# Patient Record
Sex: FEMALE | Race: WHITE | Employment: PART TIME | URBAN - METROPOLITAN AREA
[De-identification: names, ages, dates, MRNs, and addresses within clinical notes are randomized per-mention and may not be internally consistent; named-entity substitution may affect disease eponyms.]

---

## 2018-04-25 ENCOUNTER — HOSPITAL ENCOUNTER (EMERGENCY)
Age: 38
Discharge: HOME | End: 2018-04-25
Payer: COMMERCIAL

## 2018-04-25 VITALS
SYSTOLIC BLOOD PRESSURE: 131 MMHG | HEART RATE: 52 BPM | DIASTOLIC BLOOD PRESSURE: 78 MMHG | OXYGEN SATURATION: 100 % | RESPIRATION RATE: 16 BRPM | TEMPERATURE: 97.8 F | WEIGHT: 180 LBS

## 2018-04-25 DIAGNOSIS — H10.9 CONJUNCTIVITIS OF LEFT EYE, UNSPECIFIED CONJUNCTIVITIS TYPE: Primary | ICD-10-CM

## 2018-04-25 PROCEDURE — 99212 OFFICE O/P EST SF 10 MIN: CPT

## 2018-04-25 RX ORDER — OMEPRAZOLE 20 MG/1
20 CAPSULE, DELAYED RELEASE ORAL DAILY
COMMUNITY

## 2018-04-25 RX ORDER — ESTRADIOL 1 MG/1
1 TABLET ORAL DAILY
COMMUNITY

## 2018-04-25 RX ORDER — SULFACETAMIDE SODIUM 100 MG/ML
2 SOLUTION/ DROPS OPHTHALMIC 4 TIMES DAILY
Qty: 1 BOTTLE | Refills: 0 | Status: SHIPPED | OUTPATIENT
Start: 2018-04-25 | End: 2018-04-30

## 2018-04-25 ASSESSMENT — PAIN DESCRIPTION - LOCATION: LOCATION: EYE

## 2018-04-25 ASSESSMENT — PAIN DESCRIPTION - FREQUENCY: FREQUENCY: CONTINUOUS

## 2018-04-25 ASSESSMENT — PAIN DESCRIPTION - ORIENTATION: ORIENTATION: LEFT

## 2018-04-25 ASSESSMENT — PAIN DESCRIPTION - DESCRIPTORS: DESCRIPTORS: ITCHING

## 2018-04-25 ASSESSMENT — PAIN DESCRIPTION - PAIN TYPE: TYPE: ACUTE PAIN

## 2018-07-16 ENCOUNTER — HOSPITAL ENCOUNTER (OUTPATIENT)
Dept: MAMMOGRAPHY | Age: 38
Discharge: HOME | End: 2018-07-18
Payer: COMMERCIAL

## 2018-07-16 ENCOUNTER — HOSPITAL ENCOUNTER (OUTPATIENT)
Dept: ULTRASOUND IMAGING | Age: 38
Discharge: HOME | End: 2018-07-16
Payer: COMMERCIAL

## 2018-07-16 DIAGNOSIS — N64.52 NIPPLE DISCHARGE: ICD-10-CM

## 2018-07-16 DIAGNOSIS — N64.52 BILATERAL NIPPLE DISCHARGE: ICD-10-CM

## 2018-07-16 PROCEDURE — 77065 DX MAMMO INCL CAD UNI: CPT

## 2018-07-16 PROCEDURE — 76642 ULTRASOUND BREAST LIMITED: CPT

## 2018-07-31 RX ORDER — METHYLPREDNISOLONE 4 MG/1
4 TABLET ORAL SEE ADMIN INSTRUCTIONS
COMMUNITY
End: 2018-09-13 | Stop reason: ALTCHOICE

## 2018-07-31 RX ORDER — ALBUTEROL SULFATE 90 UG/1
2 AEROSOL, METERED RESPIRATORY (INHALATION) PRN
COMMUNITY
End: 2018-09-13 | Stop reason: ALTCHOICE

## 2018-09-13 ENCOUNTER — HOSPITAL ENCOUNTER (EMERGENCY)
Age: 38
Discharge: HOME | End: 2018-09-13
Payer: COMMERCIAL

## 2018-09-13 ENCOUNTER — APPOINTMENT (OUTPATIENT)
Dept: GENERAL RADIOLOGY | Age: 38
End: 2018-09-13
Payer: COMMERCIAL

## 2018-09-13 VITALS
SYSTOLIC BLOOD PRESSURE: 105 MMHG | WEIGHT: 180 LBS | DIASTOLIC BLOOD PRESSURE: 61 MMHG | HEART RATE: 60 BPM | OXYGEN SATURATION: 100 % | TEMPERATURE: 97.6 F | RESPIRATION RATE: 16 BRPM

## 2018-09-13 DIAGNOSIS — S93.401A SPRAIN OF RIGHT ANKLE, UNSPECIFIED LIGAMENT, INITIAL ENCOUNTER: Primary | ICD-10-CM

## 2018-09-13 PROCEDURE — 73610 X-RAY EXAM OF ANKLE: CPT

## 2018-09-13 PROCEDURE — 99212 OFFICE O/P EST SF 10 MIN: CPT

## 2018-09-13 PROCEDURE — L4350 ANKLE CONTROL ORTHO PRE OTS: HCPCS

## 2018-09-13 ASSESSMENT — PAIN DESCRIPTION - ORIENTATION: ORIENTATION: RIGHT

## 2018-09-13 ASSESSMENT — PAIN SCALES - GENERAL: PAINLEVEL_OUTOF10: 9

## 2018-09-13 ASSESSMENT — PAIN DESCRIPTION - FREQUENCY: FREQUENCY: CONTINUOUS

## 2018-09-13 ASSESSMENT — PAIN DESCRIPTION - DESCRIPTORS: DESCRIPTORS: ACHING;DISCOMFORT;SORE;CONSTANT

## 2018-09-13 ASSESSMENT — PAIN DESCRIPTION - LOCATION: LOCATION: ANKLE

## 2018-09-13 ASSESSMENT — PAIN DESCRIPTION - PROGRESSION: CLINICAL_PROGRESSION: GRADUALLY WORSENING

## 2018-09-13 ASSESSMENT — PAIN DESCRIPTION - PAIN TYPE: TYPE: ACUTE PAIN

## 2018-09-13 NOTE — ED PROVIDER NOTES
ROM: diminished range of motion. Skin:  Ecchymosis to lat mal.        Neurovascular: Motor deficit: none. Sensory deficit: none. Pulse deficit: none. Capillary refill: normal.  · Gait:  limp. · Lymphatics: No lymphangitis or adenopathy noted. · Neurological:  Oriented x3. Motor functions intact. Lab / Imaging Results   (All laboratory and radiology results have been personally reviewed by myself)  Labs:  No results found for this visit on 09/13/18. Imaging: All Radiology results interpreted by Radiologist unless otherwise noted. XR ANKLE RIGHT (MIN 3 VIEWS)   Final Result   Soft tissue swelling over the lateral malleolus, without   acute fracture or dislocation. If clinical suspicion of an acute   fracture persists, 7 to 10 day followup films may be obtained               ED Course / Medical Decision Making   Medications - No data to display     Consults:   None    Procedure(s):   none    MDM:       Based on moderate suspicion for bony injury as per history/physical findings imaging was done and confirms the above findings. . Plan is subsequently for symptom control, limited use and  immobilization with appropriate outpatient follow-up. Counseling:   I have  spoken with the patient and spouse/SO and discussed todays results, in addition to providing specific details for the plan of care and counseling regarding the diagnosis and prognosis. Questions are answered at this time and they are agreeable with the plan. Assessment      1.  Sprain of right ankle, unspecified ligament, initial encounter      Plan   Discharge to home and advised to contact True Morales [phone]      As needed   Patient condition is good    New Medications     New Prescriptions    No medications on file     Electronically signed by REVA Fernando   DD: 9/13/18  **This report was transcribed using voice recognition software. Every effort was made to ensure accuracy; however, inadvertent computerized transcription errors may be present.   END OF ED PROVIDER NOTE       Rhina Millerma  09/13/18 1265

## 2020-02-18 ENCOUNTER — HOSPITAL ENCOUNTER (EMERGENCY)
Age: 40
Discharge: HOME | End: 2020-02-18
Payer: OTHER GOVERNMENT

## 2020-02-18 VITALS
RESPIRATION RATE: 18 BRPM | DIASTOLIC BLOOD PRESSURE: 83 MMHG | SYSTOLIC BLOOD PRESSURE: 120 MMHG | TEMPERATURE: 98.6 F | HEART RATE: 62 BPM | WEIGHT: 200 LBS | OXYGEN SATURATION: 99 %

## 2020-02-18 LAB
INFLUENZA A BY PCR: NOT DETECTED
INFLUENZA B BY PCR: NOT DETECTED
STREP GRP A PCR: NEGATIVE

## 2020-02-18 PROCEDURE — 87804 INFLUENZA ASSAY W/OPTIC: CPT

## 2020-02-18 PROCEDURE — 87880 STREP A ASSAY W/OPTIC: CPT

## 2020-02-18 PROCEDURE — 99212 OFFICE O/P EST SF 10 MIN: CPT

## 2020-02-18 PROCEDURE — 87502 INFLUENZA DNA AMP PROBE: CPT

## 2020-02-18 RX ORDER — BROMPHENIRAMINE MALEATE, PSEUDOEPHEDRINE HYDROCHLORIDE, AND DEXTROMETHORPHAN HYDROBROMIDE 2; 30; 10 MG/5ML; MG/5ML; MG/5ML
10 SYRUP ORAL 4 TIMES DAILY PRN
Qty: 140 ML | Refills: 0 | Status: SHIPPED | OUTPATIENT
Start: 2020-02-18 | End: 2020-02-25

## 2020-02-18 RX ORDER — CETIRIZINE HYDROCHLORIDE 10 MG/1
10 TABLET ORAL DAILY
COMMUNITY

## 2020-02-18 RX ORDER — AMOXICILLIN AND CLAVULANATE POTASSIUM 875; 125 MG/1; MG/1
1 TABLET, FILM COATED ORAL 2 TIMES DAILY
Qty: 20 TABLET | Refills: 0 | Status: SHIPPED | OUTPATIENT
Start: 2020-02-18 | End: 2020-02-28

## 2020-02-18 NOTE — ED PROVIDER NOTES
Department of Emergency Medicine  [address] [place] [address]  Provider Note  Admit Date/Time: 2/18/2020 12:35 PM  Room: 07/07  MRN: [id number]  Chief Complaint: Sinusitis (congestion, drainage, fever, pressure, chills, sweats, cough, sore throat, ears ache, started Saturday)       History of Present Illness   Source of history provided by:  patient. History/Exam Limitations: none. Claire Diaz is a 46 y.o. female who has a past medical history of:   Past Medical History:   Diagnosis Date    Hearing loss of both ears     wears bilateral hearing aids - fluent speech     presents to the urgent care center by private car for evaluation. .  She reports that she feels like \"I got hit by a truck \"she is achy all over she is fatigued. She has sinus congestion and drainage she has ear pressure, she has a sore throat, she has a cough. She is achy all over. This started 2 days ago. ROS    Pertinent positives and negatives are stated within HPI, all other systems reviewed and are negative. History reviewed. No pertinent surgical history. Social History:  reports that she has been smoking cigarettes. She has smoked for the past 2.00 years. She has never used smokeless tobacco. She reports that she does not drink alcohol or use drugs. Family History: family history is not on file. Allergies: Codeine    Physical Exam   Oxygen Saturation Interpretation: Normal.   ED Triage Vitals [02/18/20 1237]   BP Temp Temp Source Pulse Resp SpO2 Height Weight   120/83 98.6 °F (37 °C) Oral 62 18 99 % -- 200 lb (90.7 kg)       Physical Exam  · Constitutional/General: Alert and oriented x3, well appearing, non toxic in NAD  · HEENT:  NC/NT. Conjunctiva clear, bilateral TMs normal, pharynx has mild erythema, no tonsillar neoadjuvant no exudates. Airway patent. Tender over the maxillary  Sinuses.   · Neck: Supple, full ROM, non tender to palpation in the midline, no stridor, no crepitus, no meningeal unspecified location      Plan   Discharge to home and advised to contact True Mcdowell [phone]      As needed   Patient condition is good    New Medications     New Prescriptions    AMOXICILLIN-CLAVULANATE (AUGMENTIN) 875-125 MG PER TABLET    Take 1 tablet by mouth 2 times daily for 10 days    BROMPHENIRAMINE-PSEUDOEPHEDRINE-DM 2-30-10 MG/5ML SYRUP    Take 10 mLs by mouth 4 times daily as needed for Congestion or Cough     Electronically signed by ALISON Richardson CNP   DD: 2/18/20  **This report was transcribed using voice recognition software. Every effort was made to ensure accuracy; however, inadvertent computerized transcription errors may be present.   END OF ED PROVIDER NOTE     ALISON Richardson CNP  02/18/20 2970

## 2021-05-26 ENCOUNTER — HOSPITAL ENCOUNTER (EMERGENCY)
Age: 41
Discharge: HOME | End: 2021-05-26
Payer: COMMERCIAL

## 2021-05-26 ENCOUNTER — APPOINTMENT (OUTPATIENT)
Dept: GENERAL RADIOLOGY | Age: 41
End: 2021-05-26
Payer: COMMERCIAL

## 2021-05-26 VITALS
HEART RATE: 77 BPM | TEMPERATURE: 98.2 F | HEIGHT: 65 IN | OXYGEN SATURATION: 98 % | SYSTOLIC BLOOD PRESSURE: 117 MMHG | WEIGHT: 200 LBS | DIASTOLIC BLOOD PRESSURE: 80 MMHG | RESPIRATION RATE: 18 BRPM | BODY MASS INDEX: 33.32 KG/M2

## 2021-05-26 DIAGNOSIS — S39.012A STRAIN OF LUMBAR REGION, INITIAL ENCOUNTER: Primary | ICD-10-CM

## 2021-05-26 PROCEDURE — 72110 X-RAY EXAM L-2 SPINE 4/>VWS: CPT

## 2021-05-26 PROCEDURE — 6360000002 HC RX W HCPCS: Performed by: NURSE PRACTITIONER

## 2021-05-26 PROCEDURE — 99211 OFF/OP EST MAY X REQ PHY/QHP: CPT

## 2021-05-26 RX ORDER — DEXAMETHASONE SODIUM PHOSPHATE 10 MG/ML
10 INJECTION, SOLUTION INTRAMUSCULAR; INTRAVENOUS ONCE
Status: DISCONTINUED | OUTPATIENT
Start: 2021-05-26 | End: 2021-05-26

## 2021-05-26 RX ORDER — MENTHOL 40 MG/ML
GEL TOPICAL
Qty: 1 TUBE | Refills: 0 | Status: SHIPPED | OUTPATIENT
Start: 2021-05-26

## 2021-05-26 RX ORDER — KETOROLAC TROMETHAMINE 30 MG/ML
30 INJECTION, SOLUTION INTRAMUSCULAR; INTRAVENOUS ONCE
Status: COMPLETED | OUTPATIENT
Start: 2021-05-26 | End: 2021-05-26

## 2021-05-26 RX ORDER — NAPROXEN 500 MG/1
500 TABLET ORAL 2 TIMES DAILY
Qty: 14 TABLET | Refills: 0 | Status: SHIPPED | OUTPATIENT
Start: 2021-05-26 | End: 2021-06-02

## 2021-05-26 RX ORDER — METHYLPREDNISOLONE 4 MG/1
4 TABLET ORAL SEE ADMIN INSTRUCTIONS
COMMUNITY
Start: 2021-05-25 | End: 2021-05-31

## 2021-05-26 RX ORDER — CYCLOBENZAPRINE HCL 10 MG
10 TABLET ORAL 2 TIMES DAILY PRN
Qty: 14 TABLET | Refills: 0 | Status: SHIPPED | OUTPATIENT
Start: 2021-05-26 | End: 2021-06-02

## 2021-05-26 RX ADMIN — KETOROLAC TROMETHAMINE 30 MG: 30 INJECTION, SOLUTION INTRAMUSCULAR; INTRAVENOUS at 08:59

## 2021-05-26 ASSESSMENT — PAIN DESCRIPTION - ORIENTATION: ORIENTATION: LOWER

## 2021-05-26 ASSESSMENT — PAIN DESCRIPTION - PAIN TYPE: TYPE: ACUTE PAIN

## 2021-05-26 ASSESSMENT — PAIN DESCRIPTION - ONSET: ONSET: ON-GOING

## 2021-05-26 ASSESSMENT — PAIN SCALES - GENERAL
PAINLEVEL_OUTOF10: 8
PAINLEVEL_OUTOF10: 6

## 2021-05-26 ASSESSMENT — PAIN DESCRIPTION - LOCATION: LOCATION: BACK

## 2021-05-26 ASSESSMENT — PAIN - FUNCTIONAL ASSESSMENT: PAIN_FUNCTIONAL_ASSESSMENT: 0-10

## 2021-05-26 ASSESSMENT — PAIN DESCRIPTION - PROGRESSION: CLINICAL_PROGRESSION: GRADUALLY WORSENING

## 2021-05-26 NOTE — ED PROVIDER NOTES
Department of Emergency [facility]  Provider Note  Admit Date/RoomTime: 2021  8:28 AM  Room:   NAME: Shawn Muniz  : 1980  MRN: [id number]     Chief Complaint:  Back Pain (Pt states \"I was bending down to  a bag of Mulch\" )    History of Present Illness       Janell Min is a 45 y.o. old female who has a past medical history of:   Past Medical History:   Diagnosis Date    Hearing loss of both ears     wears bilateral hearing aids - fluent speech     presents to the urgent care center by private vehicle, for evaluation of low back pain. She said it started 2 days ago she bent down to  a bag of mulch and had instant pain. She said any type of movement causes the pain to increase. She said walking is not painful but sitting lying or twisting or bending is very painful. She denies any saddle anesthesia denies any numbness tingling or weakness in her legs denies any fever denies any urinary symptoms. ROS   Pertinent positives and negatives are stated within HPI, all other systems reviewed and are negative. History reviewed. No pertinent surgical history. Social History:  reports that she has been smoking cigarettes. She has smoked for the past 2.00 years. She has never used smokeless tobacco. She reports that she does not drink alcohol and does not use drugs. Family History: family history is not on file. Allergies: Codeine    Physical Exam           ED Triage Vitals [21 0829]   BP Temp Temp Source Pulse Resp SpO2 Height Weight   117/80 98.2 °F (36.8 °C) Temporal 77 18 98 % 5' 5\" (1.651 m) 200 lb (90.7 kg)      Oxygen Saturation Interpretation: Normal.    Constitutional:  Alert, development consistent with age. HEENT:  NC/NT. Airway patent. Neck:  Normal ROM. Supple.   Respiratory:  Clear to auscultation and breath sounds equal.  CV:  Regular rate and rhythm, normal heart sounds, without pathological murmurs, ectopy, gallops, or rubs. GI:  Abdomen Soft, nontender, good bowel sounds. No firm or pulsatile mass. Back: lower lumbar spine central.             Tenderness: None. Swelling: no.              Range of Motion: diminished range with pain. CVA Tenderness: No.            Straight leg raising:  Bilateral negative. Skin:  no erythema, rash or swelling noted. Distal Function:            Normal gait, normal strength in the bilateral lower extremities. Integument:  Normal turgor. Warm, dry, without visible rash. Lymphatics: No lymphangitis or adenopathy noted. Neurological:  Oriented. Motor functions intact. Lab / Imaging Results   (All laboratory and radiology results have been personally reviewed by myself)  Labs:  No results found for this visit on 05/26/21. Imaging: All Radiology results interpreted by Radiologist unless otherwise noted. XR LUMBAR SPINE (MIN 4 VIEWS)   Final Result   1. There is no acute fracture or subluxation of the lumbar spine   2. Mild degenerative disc disease at the L3-4 level. ED Course / Medical Decision Making     Medications   ketorolac (TORADOL) injection 30 mg (30 mg Intramuscular Given 5/26/21 0859)        RMedical Decision Making: At this time the patient is without objective evidence of an acute process requiring inpatient management. X-ray was done and it did show some degenerative changes but no other acute findings. She was given Toradol IM here with slight relief. I did send her home with a prescription for Naprosyn, Flexeril, and Biofreeze advised to take those as needed. Advised her to follow-up with her doctor if she develops fever numbness tingling or weakness in her legs or groin area urinary incontinence or symptoms or any other problems she should go to the ED for evaluation. Assessment      1.  Strain of lumbar region, initial encounter      Plan   Discharge to home and advised to contact Jayjay York

## 2022-03-16 ENCOUNTER — HOSPITAL ENCOUNTER (OUTPATIENT)
Dept: MAMMOGRAPHY | Age: 42
Discharge: HOME | End: 2022-03-18
Payer: OTHER GOVERNMENT

## 2022-03-16 VITALS — BODY MASS INDEX: 35.85 KG/M2 | WEIGHT: 210 LBS | HEIGHT: 64 IN

## 2022-03-16 DIAGNOSIS — Z12.31 ENCOUNTER FOR SCREENING MAMMOGRAM FOR MALIGNANT NEOPLASM OF BREAST: ICD-10-CM

## 2022-03-16 PROCEDURE — 77067 SCR MAMMO BI INCL CAD: CPT

## 2022-03-31 ENCOUNTER — HOSPITAL ENCOUNTER (EMERGENCY)
Age: 42
Discharge: HOME | End: 2022-03-31
Payer: OTHER GOVERNMENT

## 2022-03-31 VITALS
DIASTOLIC BLOOD PRESSURE: 80 MMHG | WEIGHT: 200 LBS | BODY MASS INDEX: 34.15 KG/M2 | RESPIRATION RATE: 18 BRPM | SYSTOLIC BLOOD PRESSURE: 107 MMHG | OXYGEN SATURATION: 98 % | HEART RATE: 108 BPM | TEMPERATURE: 100.9 F | HEIGHT: 64 IN

## 2022-03-31 DIAGNOSIS — J06.9 URI WITH COUGH AND CONGESTION: Primary | ICD-10-CM

## 2022-03-31 LAB — STREP GRP A PCR: NEGATIVE

## 2022-03-31 PROCEDURE — 87880 STREP A ASSAY W/OPTIC: CPT

## 2022-03-31 PROCEDURE — 99211 OFF/OP EST MAY X REQ PHY/QHP: CPT

## 2022-03-31 RX ORDER — AZITHROMYCIN 250 MG/1
TABLET, FILM COATED ORAL
Qty: 1 PACKET | Refills: 0 | Status: SHIPPED | OUTPATIENT
Start: 2022-03-31 | End: 2022-04-10

## 2022-03-31 RX ORDER — BROMPHENIRAMINE MALEATE, PSEUDOEPHEDRINE HYDROCHLORIDE, AND DEXTROMETHORPHAN HYDROBROMIDE 2; 30; 10 MG/5ML; MG/5ML; MG/5ML
10 SYRUP ORAL 3 TIMES DAILY PRN
Qty: 120 ML | Refills: 0 | Status: SHIPPED | OUTPATIENT
Start: 2022-03-31

## 2022-03-31 ASSESSMENT — PAIN DESCRIPTION - LOCATION: LOCATION: GENERALIZED

## 2022-03-31 NOTE — ED PROVIDER NOTES
[facility]  Department of Emergency Medicine  UC Encounter Note  3/31/22   8:40 AM EDT      NAME: Yossi Mack  :  1980  MRN:  [id number]    Chief Complaint: Fever (ears sore throat cough headache congestion started tues)      This is a 60-year-old female the presents to urgent care complaining of cough and URI symptoms sinus symptoms for the past couple days. Other people in the family have similar problems. She denies abdominal pain nausea vomiting diarrhea or urinary symptoms. On first contact patient she appears to be in no acute distress          Review of Systems  Pertinent positives and negatives are stated within HPI, all other systems reviewed and are negative. Physical Exam  Vitals and nursing note reviewed. Constitutional:       Appearance: She is well-developed. HENT:      Head: Normocephalic and atraumatic. Jaw: There is normal jaw occlusion. Right Ear: Hearing, ear canal and external ear normal. Tympanic membrane is bulging. Left Ear: Hearing, ear canal and external ear normal. Tympanic membrane is bulging. Nose: Congestion and rhinorrhea present. Right Sinus: Frontal sinus tenderness present. Left Sinus: Frontal sinus tenderness present. Mouth/Throat:      Mouth: Mucous membranes are moist. No angioedema. Pharynx: Oropharynx is clear. Uvula midline. Eyes:      General: Lids are normal.      Conjunctiva/sclera: Conjunctivae normal.      Pupils: Pupils are equal, round, and reactive to light. Cardiovascular:      Rate and Rhythm: Normal rate and regular rhythm. Heart sounds: Normal heart sounds. No murmur heard. Pulmonary:      Effort: Pulmonary effort is normal.      Breath sounds: Normal breath sounds. Abdominal:      General: Bowel sounds are normal.      Palpations: Abdomen is soft. Abdomen is not rigid. Tenderness: There is no abdominal tenderness. There is no guarding or rebound. Musculoskeletal:      Cervical back: Normal range of motion and neck supple. Skin:     General: Skin is warm and dry. Findings: No abrasion or rash. Neurological:      General: No focal deficit present. Mental Status: She is alert and oriented to person, place, and time. GCS: GCS eye subscore is 4. GCS verbal subscore is 5. GCS motor subscore is 6. Cranial Nerves: No cranial nerve deficit. Sensory: No sensory deficit. Coordination: Coordination normal.      Gait: Gait normal.         Procedures    MDM  Number of Diagnoses or Management Options  URI with cough and congestion  Diagnosis management comments: Strep screen was negative. No acute distress medications discussed. Instructions given.           --------------------------------------------- PAST HISTORY ---------------------------------------------  Past Medical History:  has a past medical history of Hearing loss of both ears. Past Surgical History:  has no past surgical history on file. Social History:  reports that she has been smoking cigarettes. She has smoked for the past 2.00 years. She has never used smokeless tobacco. She reports that she does not drink alcohol and does not use drugs. Family History: family history includes Cancer in her father and paternal grandmother; Stomach Cancer in her paternal grandmother. The patients home medications have been reviewed. Allergies: Codeine    -------------------------------------------------- RESULTS -------------------------------------------------  Results for orders placed or performed during the hospital encounter of 03/31/22   Strep Screen Group A Throat    Specimen: Throat   Result Value Ref Range    Strep Grp A PCR Negative Negative     No orders to display       ------------------------- NURSING NOTES AND VITALS REVIEWED ---------------------------   The nursing notes within the ED encounter and vital signs as below have been reviewed.    /80 Pulse 108   Temp 100.9 °F (38.3 °C)   Resp 18   Ht 5' 4\" (1.626 m)   Wt 200 lb (90.7 kg)   LMP 09/08/2012   SpO2 98%   BMI 34.33 kg/m²   Oxygen Saturation Interpretation: Normal      ------------------------------------------ PROGRESS NOTES ------------------------------------------   I have spoken with the patient and discussed todays results, in addition to providing specific details for the plan of care and counseling regarding the diagnosis and prognosis. Their questions are answered at this time and they are agreeable with the plan.      --------------------------------- ADDITIONAL PROVIDER NOTES ---------------------------------     This patient is stable for discharge. I have shared the specific conditions for return, as well as the importance of follow-up. * NOTE: This report was transcribed using voice recognition software.  Every effort was made to ensure accuracy; however, inadvertent computerized transcription errors may be present.    --------------------------------- IMPRESSION AND DISPOSITION ---------------------------------    IMPRESSION  1. URI with cough and congestion        DISPOSITION  Disposition: Discharge to home  Patient condition is good       Germaine Davis PA-C  03/31/22 8087

## 2022-09-09 ENCOUNTER — HOSPITAL ENCOUNTER (EMERGENCY)
Age: 42
Discharge: HOME | End: 2022-09-09
Payer: COMMERCIAL

## 2022-09-09 VITALS
SYSTOLIC BLOOD PRESSURE: 129 MMHG | HEART RATE: 70 BPM | TEMPERATURE: 98.7 F | BODY MASS INDEX: 34.15 KG/M2 | RESPIRATION RATE: 20 BRPM | DIASTOLIC BLOOD PRESSURE: 84 MMHG | OXYGEN SATURATION: 99 % | WEIGHT: 200 LBS | HEIGHT: 64 IN

## 2022-09-09 DIAGNOSIS — J06.9 UPPER RESPIRATORY TRACT INFECTION, UNSPECIFIED TYPE: Primary | ICD-10-CM

## 2022-09-09 PROCEDURE — 99211 OFF/OP EST MAY X REQ PHY/QHP: CPT

## 2022-09-09 RX ORDER — LORATADINE 10 MG/1
10 TABLET ORAL DAILY
Qty: 30 TABLET | Refills: 0 | Status: SHIPPED | OUTPATIENT
Start: 2022-09-09 | End: 2022-10-09

## 2022-09-09 RX ORDER — BENZONATATE 100 MG/1
100 CAPSULE ORAL 3 TIMES DAILY PRN
Qty: 30 CAPSULE | Refills: 0 | Status: SHIPPED | OUTPATIENT
Start: 2022-09-09 | End: 2022-09-16

## 2022-09-09 RX ORDER — METHYLPREDNISOLONE 4 MG/1
TABLET ORAL
Qty: 1 KIT | Refills: 0 | Status: SHIPPED | OUTPATIENT
Start: 2022-09-09 | End: 2022-09-15

## 2022-09-09 ASSESSMENT — PAIN - FUNCTIONAL ASSESSMENT: PAIN_FUNCTIONAL_ASSESSMENT: NONE - DENIES PAIN

## 2022-09-09 NOTE — ED PROVIDER NOTES
comfortable. The patient is alert and oriented and conversant. Head: The head is atraumatic and normocephalic. Eyes: No discharge is present from the eyes. The sclera are normal.  Ears: TMs bilaterally demonstrate no erythema, no bulging and no evidence of infection. Mouth: The oropharynx demonstrates a small amount of erythema bilaterally. There is no tonsillar enlargement nor is there any exudate present. No uvular deviation or edema. No tonsillary asymmetry. Floor of the mouth soft, no trismus, handling secretions. Neck: Normal range of motion is achieved in the neck. There is no JVD present. No meningeal signs are present   Anterior cervical adenopathy is absent. Respiratory: The chest is nontender. Breath sounds are clear to auscultation bilaterally. No wheezes, rales, or rhonchi. There is no respiratory distress. Cardiovascular: Heart shows a regular rate and rhythm without murmurs, rubs, clicks or gallops. Abdominal exam: The abdomen is non tender without evidence of peritoneal signs. Specific attention to the left upper quadrant with palpation of the spleen demonstrates no organomegaly or tenderness  Skin: warm and dry, without rash  Neurologic: GCS 15   Psych: Normal Affect  -------------------------------------------------- RESULTS -------------------------------------------------    LABS:  No results found for this visit on 09/09/22. RADIOLOGY:  Interpreted by Radiologist.  No orders to display             ------------------------- NURSING NOTES AND VITALS REVIEWED ---------------------------   The nursing notes within the ED encounter and vital signs as below have been reviewed. /84   Pulse 70   Temp 98.7 °F (37.1 °C) (Infrared)   Resp 20   Ht 5' 4\" (1.626 m)   Wt 200 lb (90.7 kg)   LMP 09/08/2012   SpO2 99%   BMI 34.33 kg/m²   Oxygen Saturation Interpretation: Normal    The patients available past medical records and past encounters were reviewed. ------------------------------ ED COURSE/MEDICAL DECISION MAKING----------------------  Medications - No data to display          Medical Decision Making:         Upper respiratory infection likely viral in etiology. Not hypoxic, nothing to suggest pneumonia. Well appearing, non toxic, appropriate for outpatient management. Plan is for symptom management and PCP follow up. This patient's ED course included: a personal history and physicial eaxmination and re-evaluation prior to disposition    This patient has remained hemodynamically stable during their ED course. Counseling: The emergency provider has spoken with the patient and discussed todays results, in addition to providing specific details for the plan of care and counseling regarding the diagnosis and prognosis. Questions are answered at this time and they are agreeable with the plan.       --------------------------------- IMPRESSION AND DISPOSITION ---------------------------------    IMPRESSION  1.  Upper respiratory tract infection, unspecified type        DISPOSITION  Disposition: Discharge to home  Patient condition is good            Amelie Males, [address]  09/09/22 4279

## 2022-09-09 NOTE — Clinical Note
Traci Narvaez was seen and treated in our emergency department on 9/9/2022. She may return to work on 09/10/2022. If you have any questions or concerns, please don't hesitate to call.       REVA Pike

## 2022-10-09 ENCOUNTER — HOSPITAL ENCOUNTER (EMERGENCY)
Age: 42
Discharge: HOME | End: 2022-10-09
Payer: COMMERCIAL

## 2022-10-09 VITALS
TEMPERATURE: 98.6 F | DIASTOLIC BLOOD PRESSURE: 86 MMHG | BODY MASS INDEX: 37.76 KG/M2 | OXYGEN SATURATION: 99 % | HEART RATE: 90 BPM | RESPIRATION RATE: 16 BRPM | SYSTOLIC BLOOD PRESSURE: 134 MMHG | WEIGHT: 220 LBS

## 2022-10-09 DIAGNOSIS — H66.002 NON-RECURRENT ACUTE SUPPURATIVE OTITIS MEDIA OF LEFT EAR WITHOUT SPONTANEOUS RUPTURE OF TYMPANIC MEMBRANE: Primary | ICD-10-CM

## 2022-10-09 PROCEDURE — 99211 OFF/OP EST MAY X REQ PHY/QHP: CPT

## 2022-10-09 RX ORDER — AMOXICILLIN AND CLAVULANATE POTASSIUM 875; 125 MG/1; MG/1
1 TABLET, FILM COATED ORAL 2 TIMES DAILY
Qty: 20 TABLET | Refills: 0 | Status: SHIPPED | OUTPATIENT
Start: 2022-10-09 | End: 2022-10-19

## 2022-10-09 ASSESSMENT — PAIN - FUNCTIONAL ASSESSMENT: PAIN_FUNCTIONAL_ASSESSMENT: NONE - DENIES PAIN

## 2022-10-09 NOTE — ED PROVIDER NOTES
Cardiac: Regular rate rhythm no murmur. Lungs: Good aeration throughout. No adventitious breath sounds. Abdomen: Soft, nontender, nonsurgical throughout. Normoactive bowel sounds. Extremities: No peripheral edema, negative Homans bilaterally no cords. Skin: No rash. Neuro: No gross neurologic deficits. Lab / Imaging Results   (All laboratory and radiology results have been personally reviewed by myself)  Labs:  No results found for this visit on 10/09/22. Imaging: All Radiology results interpreted by Radiologist unless otherwise noted. No orders to display     ED Course / Medical Decision Making   Medications - No data to display       Differential Diagnosis: Is extensive but includes viral URI, otitis media/externa, malignant otitis externa, mastoiditis, exudative pharyngitis, TM perforation, etc.    MDM:     This is a 40 y.o. female who presents with the above history. On exam, appears to have an acute otitis media without evidence of perforation or mastoiditis. Will be home-going with Augmentin. Plan of Care: Normal progression of disease discussed. All questions answered. Explained symptomatic treatment. Instruction provided in the use of fluids, vaporizer, acetaminophen, and other OTC medication for symptom control. Extra fluids  Analgesics as needed, dose reviewed. Follow up as needed should symptoms fail to improve. Counseling: Homegoing. I discussed the differential, results and discharge plan with the patient and/or family/friend/caregiver if present. I emphasized the importance of follow-up with the physician I referred them to in the timeframe recommended. I explained reasons for the patient to return to the Emergency Department. Additional verbal discharge instructions were also given and discussed with the patient to supplement those generated by the EMR. We also discussed medications that were prescribed (if any) including common side effects and interactions.  The patient was advised to abstain from driving, operating heavy machinery or making significant decisions while taking medications such as opiates and muscle relaxers that may impair this. All questions were addressed. They understand return precautions and discharge instructions. The patient and/or family/friend/caregiver expressed understanding. Assessment      1. Non-recurrent acute suppurative otitis media of left ear without spontaneous rupture of tympanic membrane      Plan   Discharge to home and advised to contact [facility], [phone] [phone]      As needed   Patient condition is good    New Medications     New Prescriptions    AMOXICILLIN-CLAVULANATE (AUGMENTIN) 875-125 MG PER TABLET    Take 1 tablet by mouth 2 times daily for 10 days     Electronically signed by REVA Juarez   DD: 10/9/22  **This report was transcribed using voice recognition software. Every effort was made to ensure accuracy; however, inadvertent computerized transcription errors may be present.   END OF ED PROVIDER NOTE          Author Fabio [address] [place], [place]  10/09/22 2175

## 2023-12-22 ENCOUNTER — HOSPITAL ENCOUNTER (EMERGENCY)
Age: 43
Discharge: HOME | End: 2023-12-22
Payer: COMMERCIAL

## 2023-12-22 VITALS
HEART RATE: 95 BPM | WEIGHT: 210 LBS | BODY MASS INDEX: 36.05 KG/M2 | TEMPERATURE: 98.2 F | RESPIRATION RATE: 18 BRPM | DIASTOLIC BLOOD PRESSURE: 67 MMHG | OXYGEN SATURATION: 99 % | SYSTOLIC BLOOD PRESSURE: 127 MMHG

## 2023-12-22 DIAGNOSIS — J20.8 ACUTE BACTERIAL BRONCHITIS: Primary | ICD-10-CM

## 2023-12-22 DIAGNOSIS — B96.89 ACUTE BACTERIAL BRONCHITIS: Primary | ICD-10-CM

## 2023-12-22 LAB
INFLUENZA A BY PCR: NOT DETECTED
INFLUENZA B BY PCR: NOT DETECTED
SARS-COV-2 RDRP RESP QL NAA+PROBE: NOT DETECTED
SPECIMEN DESCRIPTION: NORMAL

## 2023-12-22 PROCEDURE — 87502 INFLUENZA DNA AMP PROBE: CPT

## 2023-12-22 PROCEDURE — 87635 SARS-COV-2 COVID-19 AMP PRB: CPT

## 2023-12-22 PROCEDURE — 99211 OFF/OP EST MAY X REQ PHY/QHP: CPT

## 2023-12-22 RX ORDER — BROMPHENIRAMINE MALEATE, PSEUDOEPHEDRINE HYDROCHLORIDE, AND DEXTROMETHORPHAN HYDROBROMIDE 2; 30; 10 MG/5ML; MG/5ML; MG/5ML
5 SYRUP ORAL 4 TIMES DAILY PRN
Qty: 140 ML | Refills: 0 | Status: SHIPPED | OUTPATIENT
Start: 2023-12-22 | End: 2023-12-29

## 2023-12-22 RX ORDER — AMOXICILLIN AND CLAVULANATE POTASSIUM 875; 125 MG/1; MG/1
1 TABLET, FILM COATED ORAL 2 TIMES DAILY
Qty: 20 TABLET | Refills: 0 | Status: SHIPPED | OUTPATIENT
Start: 2023-12-22 | End: 2024-01-01

## 2023-12-22 NOTE — ED PROVIDER NOTES
[facility]  Department of Emergency Medicine   UC  Encounter Note  Admit Date/RoomTime: 2023  6:34 PM   Room:     NAME: Augustus Savage  : 1980  MRN: [id number]     Chief Complaint:  Chest Congestion, Pharyngitis, Cough, and Otalgia    History of Present Illness       Augustus Savage is a 44 y.o. old female who presents to the urgent care by private vehicle with complaints of a 9-day history of progressively worsening cough, chest congestion, bilateral otalgia, mucopurulent sputum production. Patient states that she was initially sick with a viral URI. 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 Her daughter also has similar symptoms. She did call her PCP and was given a steroid, states that this did not help much. States that symptoms are moderate to severe. She has had no chest pain, shortness of breath. ROS   Pertinent positives and negatives are stated within HPI, all other systems reviewed and are negative. Past Medical History:  has a past medical history of Hearing loss of both ears. Surgical History:  has no past surgical history on file. Social History:  reports that she has been smoking cigarettes. She has never used smokeless tobacco. She reports that she does not drink alcohol and does not use drugs. Family History: family history includes Cancer in her father and paternal grandmother; Stomach Cancer in her paternal grandmother. Allergies: Codeine    Physical Exam   Oxygen Saturation Interpretation: Normal on room air analysis. ED Triage Vitals   BP Temp Temp src Pulse Resp SpO2 Height Weight   -- -- -- -- -- -- -- --         Constitutional:  Alert, development consistent with age. Ears:  External Ears: Bilateral normal.               TM's & External Canals: normal TM's and external ear canals bilaterally. Nose:   There is no discharge, swelling or lesions noted.   Sinuses: no Bilateral maxillary sinus tenderness. no Bilateral frontal sinus tenderness. Mouth:  normal tongue and buccal mucosa. Throat: no erythema or exudates noted. Teeth and gums normal..  Airway Patent. Neck:  Supple. No meningeal signs. There is no  preauricular and anterior cervical node tenderness. Respiratory:   Breath sounds: Bilateral normal.  Lung sounds: normal.   CV:  Regular rate and rhythm, normal heart sounds, without pathological murmurs, ectopy, gallops, or rubs. GI:  Abdomen Soft, nontender, good bowel sounds. No firm or pulsatile mass. Integument:  Normal turgor. Warm, dry, without visible rash. Neurological:  Oriented. Motor functions intact. Lab / Imaging Results   (All laboratory and radiology results have been personally reviewed by myself)  Labs:  Results for orders placed or performed during the hospital encounter of 12/22/23   COVID-19, Rapid    Specimen: Nasopharyngeal Swab   Result Value Ref Range    Specimen Description . NASOPHARYNGEAL SWAB     SARS-CoV-2, Rapid Not Detected Not Detected   Influenza A+B, PCR    Specimen: Nasopharyngeal   Result Value Ref Range    Influenza A by PCR Not Detected Not Detected    Influenza B by PCR Not Detected Not Detected       Imaging: All Radiology results interpreted by Radiologist unless otherwise noted. No orders to display       ED Course / Medical Decision Making   Medications - No data to display  ED Course as of 12/22/23 1926   Fri Dec 22, 2023   1904 COVID-19, Rapid:    Specimen Description . NASOPHARYNGEAL SWAB   SARS-CoV-2, Rapid Not Detected  COVID testing is negative. [CS]   1906 Influenza A+B, PCR:    Influenza A by PCR Not Detected   Influenza B by PCR Not Detected  Influenza testing is negative.  [CS]      ED Course User Index  [CS] ALISON Choe - CNP       Consults:   None    Procedures:   none    Medical Decision Making: Please refer to HPI, this 61-year-old female patient who presents with a 9-day history of

## 2023-12-23 NOTE — DISCHARGE INSTRUCTIONS
Rest, drink plenty of fluids, Augmentin twice daily for 10 days. Bromfed-DM 4 times daily as needed for cough and congestion. Please follow with your PCP in 1 week.

## 2024-01-05 ENCOUNTER — HOSPITAL ENCOUNTER (OUTPATIENT)
Dept: MAMMOGRAPHY | Age: 44
Discharge: HOME | End: 2024-01-05
Attending: OBSTETRICS & GYNECOLOGY
Payer: COMMERCIAL

## 2024-01-05 VITALS — WEIGHT: 210 LBS | BODY MASS INDEX: 34.99 KG/M2 | HEIGHT: 65 IN

## 2024-01-05 DIAGNOSIS — Z12.31 ENCOUNTER FOR SCREENING MAMMOGRAM FOR MALIGNANT NEOPLASM OF BREAST: ICD-10-CM

## 2024-01-05 PROCEDURE — 77063 BREAST TOMOSYNTHESIS BI: CPT

## 2024-04-11 ENCOUNTER — HOSPITAL ENCOUNTER (EMERGENCY)
Age: 44
Discharge: HOME | End: 2024-04-11
Payer: COMMERCIAL

## 2024-04-11 VITALS
OXYGEN SATURATION: 100 % | TEMPERATURE: 98.2 F | DIASTOLIC BLOOD PRESSURE: 100 MMHG | SYSTOLIC BLOOD PRESSURE: 153 MMHG | RESPIRATION RATE: 18 BRPM | WEIGHT: 220 LBS | HEART RATE: 79 BPM | HEIGHT: 65 IN | BODY MASS INDEX: 36.65 KG/M2

## 2024-04-11 DIAGNOSIS — M54.41 ACUTE RIGHT-SIDED LOW BACK PAIN WITH RIGHT-SIDED SCIATICA: Primary | ICD-10-CM

## 2024-04-11 PROCEDURE — 99211 OFF/OP EST MAY X REQ PHY/QHP: CPT

## 2024-04-11 PROCEDURE — 6360000002 HC RX W HCPCS: Performed by: NURSE PRACTITIONER

## 2024-04-11 PROCEDURE — 96372 THER/PROPH/DIAG INJ SC/IM: CPT

## 2024-04-11 RX ORDER — LIDOCAINE 50 MG/G
1 PATCH TOPICAL DAILY
Qty: 10 PATCH | Refills: 0 | Status: SHIPPED | OUTPATIENT
Start: 2024-04-11 | End: 2024-04-21

## 2024-04-11 RX ORDER — ORPHENADRINE CITRATE 30 MG/ML
60 INJECTION INTRAMUSCULAR; INTRAVENOUS ONCE
Status: COMPLETED | OUTPATIENT
Start: 2024-04-11 | End: 2024-04-11

## 2024-04-11 RX ORDER — METHYLPREDNISOLONE 4 MG/1
TABLET ORAL
Qty: 21 TABLET | Refills: 0 | Status: SHIPPED | OUTPATIENT
Start: 2024-04-11 | End: 2024-04-17

## 2024-04-11 RX ORDER — CYCLOBENZAPRINE HCL 10 MG
10 TABLET ORAL 3 TIMES DAILY PRN
Qty: 21 TABLET | Refills: 0 | Status: SHIPPED | OUTPATIENT
Start: 2024-04-11 | End: 2024-04-21

## 2024-04-11 RX ORDER — DEXAMETHASONE SODIUM PHOSPHATE 10 MG/ML
10 INJECTION, SOLUTION INTRAMUSCULAR; INTRAVENOUS ONCE
Status: COMPLETED | OUTPATIENT
Start: 2024-04-11 | End: 2024-04-11

## 2024-04-11 RX ADMIN — DEXAMETHASONE SODIUM PHOSPHATE 10 MG: 10 INJECTION, SOLUTION INTRAMUSCULAR; INTRAVENOUS at 08:47

## 2024-04-11 RX ADMIN — ORPHENADRINE CITRATE 60 MG: 60 INJECTION INTRAMUSCULAR; INTRAVENOUS at 08:46

## 2024-04-11 ASSESSMENT — PAIN - FUNCTIONAL ASSESSMENT
PAIN_FUNCTIONAL_ASSESSMENT: ACTIVITIES ARE NOT PREVENTED
PAIN_FUNCTIONAL_ASSESSMENT: 0-10

## 2024-04-11 ASSESSMENT — PAIN DESCRIPTION - LOCATION: LOCATION: BACK

## 2024-04-11 ASSESSMENT — PAIN DESCRIPTION - ORIENTATION: ORIENTATION: RIGHT;LOWER

## 2024-04-11 ASSESSMENT — PAIN SCALES - GENERAL: PAINLEVEL_OUTOF10: 7

## 2024-04-11 ASSESSMENT — PAIN DESCRIPTION - DESCRIPTORS: DESCRIPTORS: ACHING

## 2024-04-11 NOTE — DISCHARGE INSTRUCTIONS
Start the Medrol Dosepak tomorrow.  I am giving you a high-dose steroid now that will last 24 hours.  You may start the Flexeril tonight before bedtime and then 3 times daily as needed moving forward.  Please follow-up with your PCP and you may consider following up with physical medicine rehabilitation as you may benefit from physical therapy.  Please perform the attached stretching exercises.

## 2024-04-11 NOTE — ED PROVIDER NOTES
[facility]  Department of Emergency Medicine     Encounter Note  Admit Date/RoomTime: 2024  8:25 AM   Room:     NAME: Janell Diaz  : 1980  MRN: [id number]     Chief Complaint:  Back Pain (Working in yard  and over did it. Went to work Tuesday and helping child in and out of chair hurts back. Pt states she has been taking IBU and using heat on right side lower back down to hip)    History of Present Illness       Janell Diaz is a 43 y.o. old female with a prior history of no prior back problems, presents to the emergency department by private vehicle, for non-traumatic acute, aching, stiffness, stretching sensation, and cramping right sided, diffuse, paraspinal lower lumbar spine and sacral spine pain with radiation, to the right buttocks, to the right thigh, for 3 day(s) prior to arrival.  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  States that every time she lifts the child she has worsening pain on the right side of her low back..   Since onset the symptoms have been recurrent and worsening and is moderate in severity.  She has associated signs & symptoms of nothing additional.   She denies any bladder or bowel incontinence , new weakness, tingling or paresthesias, recent back injection, recent spinal surgery, recent spinal/chiropractic manipulation, history of IVDA, fever, abdominal pain, bladder incontinence, bowel incontinence, bladder retention, bladder urgency, bowel urgency, saddle paresthesias , or sacral numbness.   The pain is aggraveated by straining, exercise, flexion, extension, twisting, sitting, and lying down and relieved by standing.  She is not currently enrolled in an pain management program or managed by PCP or back specialist.        ROS   Pertinent positives and negatives

## 2024-11-15 ENCOUNTER — HOSPITAL ENCOUNTER (EMERGENCY)
Age: 44
Discharge: HOME | End: 2024-11-15
Payer: COMMERCIAL

## 2024-11-15 VITALS
OXYGEN SATURATION: 100 % | DIASTOLIC BLOOD PRESSURE: 90 MMHG | SYSTOLIC BLOOD PRESSURE: 143 MMHG | BODY MASS INDEX: 36.65 KG/M2 | RESPIRATION RATE: 18 BRPM | HEART RATE: 82 BPM | HEIGHT: 65 IN | WEIGHT: 220 LBS | TEMPERATURE: 98.6 F

## 2024-11-15 DIAGNOSIS — J06.9 ACUTE UPPER RESPIRATORY INFECTION: Primary | ICD-10-CM

## 2024-11-15 PROCEDURE — 99211 OFF/OP EST MAY X REQ PHY/QHP: CPT

## 2024-11-15 RX ORDER — METHYLPREDNISOLONE 4 MG/1
TABLET ORAL
Qty: 1 KIT | Refills: 0 | Status: SHIPPED | OUTPATIENT
Start: 2024-11-15

## 2024-11-15 RX ORDER — BENZONATATE 200 MG/1
200 CAPSULE ORAL 3 TIMES DAILY PRN
Qty: 15 CAPSULE | Refills: 0 | Status: SHIPPED | OUTPATIENT
Start: 2024-11-15

## 2024-11-15 ASSESSMENT — PAIN - FUNCTIONAL ASSESSMENT: PAIN_FUNCTIONAL_ASSESSMENT: 0-10

## 2024-11-15 ASSESSMENT — PAIN SCALES - GENERAL: PAINLEVEL_OUTOF10: 0

## 2024-11-15 NOTE — ED PROVIDER NOTES
abnormal lab results are displayed. All other labs were within normal range or not returned as of this dictation.    Interpretation per the Radiologist below, if available at the time of this note:    No orders to display     No results found.    No results found.     -------------------------------------------------PROCEDURES--------------------------------------------  Unless otherwise noted below, none      Procedures      ---EMERGENCY DEPARTMENT COURSE and DIFFERENTIAL DIAGNOSIS/MDM---  (CC/HPI Summary, DDx, ED Course, and Reassessment:) (Disposition Considerations (include 1 Tests not done, Admit vs D/C, Shared Decision Making, Pt Expectation of Test or Tx., Consults, Social Determinants, Chronic Conditiions, Records reviewed)    MDM  Number of Diagnoses or Management Options  Acute upper respiratory infection  Diagnosis management comments: No acute distress does not look septic.  Does have some mild URI and cough symptoms lungs are clear to auscultation O2 sat 100% afebrile.  May take Mucinex for chest congestion add Tessalon Perles for cough.  Tylenol for pain and fever use Medrol Dosepak to help with bodyaches fatigue and discomfort.  Instructions given.  Take Zyrtec which she has at home for runny nose.         DISCHARGE MEDICATIONS:  Discharge Medication List as of 11/15/2024  9:04 AM        START taking these medications    Details   methylPREDNISolone (MEDROL, SUSU,) 4 MG tablet Take as directed., Disp-1 kit, R-0Normal      benzonatate (TESSALON) 200 MG capsule Take 1 capsule by mouth 3 times daily as needed for Cough, Disp-15 capsule, R-0Normal             DISCONTINUED MEDICATIONS:  Discharge Medication List as of 11/15/2024  9:04 AM          PATIENT REFERRED TO:  Sana Bowden MD  [address] [facility] [address] [phone]    Schedule an appointment as soon as possible for a visit         --------------------------------- ADDITIONAL PROVIDER NOTES ---------------------------------  I have  spoken with the patient and discussed today’s results, in addition to providing specific details for the plan of care and counseling regarding the diagnosis and prognosis.  Their questions are answered at this time and they are agreeable with the plan.   This patient is stable for discharge.  I have shared the specific conditions for return, as well as the importance of follow-up.      * NOTE: (Please note that portions of this note were completed with a voice recognition program.  Efforts were made to edit the dictations but occasionally words are mis-transcribed.)    --------------------------------- IMPRESSION AND DISPOSITION ---------------------------------    IMPRESSION  1. Acute upper respiratory infection        Disposition: Discharge to home  Patient condition is good    I am the Primary Clinician of Record.     Sharad Walker PA-C (electronically signed) on 11/15/2024 at 9:09 AM         Sharad Walker PA-C  11/15/24 0909

## 2025-01-30 ENCOUNTER — HOSPITAL ENCOUNTER (OUTPATIENT)
Dept: MAMMOGRAPHY | Age: 45
Discharge: HOME | End: 2025-02-01
Payer: COMMERCIAL

## 2025-01-30 DIAGNOSIS — Z12.31 VISIT FOR SCREENING MAMMOGRAM: ICD-10-CM

## 2025-01-30 PROCEDURE — 77063 BREAST TOMOSYNTHESIS BI: CPT

## 2025-01-31 ENCOUNTER — TELEPHONE (OUTPATIENT)
Dept: MAMMOGRAPHY | Age: 45
End: 2025-01-31

## 2025-01-31 NOTE — TELEPHONE ENCOUNTER
Order request faxed to Dr. Small for left breast diagnostic mammogram and limited ultrasound as recommended from mammogram performed at [facility] on 1/30/25. Successful fax confirmation. NEMO GillilandN, RN -Breast Navigator

## 2025-02-03 ENCOUNTER — TELEPHONE (OUTPATIENT)
Dept: MAMMOGRAPHY | Age: 45
End: 2025-02-03

## 2025-02-03 NOTE — TELEPHONE ENCOUNTER
Call to patient in reference to her mammogram performed at [facility] on 1/30/25. Instructed patient that the radiologist has recommended additional breast imaging in order to make a final determination and further recommendations. Patient verbalized understanding and is agreeable to proceed at [facility]. Orders received per Dr. Small and scanned into media. Patient scheduled for 2/14/25 at 8:30 am. NUHA Gilliland, RN -Breast Navigator

## 2025-02-14 ENCOUNTER — HOSPITAL ENCOUNTER (OUTPATIENT)
Dept: ULTRASOUND IMAGING | Age: 45
Discharge: HOME | End: 2025-02-14
Payer: COMMERCIAL

## 2025-02-14 ENCOUNTER — HOSPITAL ENCOUNTER (OUTPATIENT)
Dept: MAMMOGRAPHY | Age: 45
Discharge: HOME | End: 2025-02-16
Payer: COMMERCIAL

## 2025-02-14 DIAGNOSIS — R92.342 MAMMOGRAPHIC EXTREME DENSITY, LEFT BREAST: ICD-10-CM

## 2025-02-14 DIAGNOSIS — R92.8 ABNORMAL MAMMOGRAM: ICD-10-CM

## 2025-02-14 PROCEDURE — 76642 ULTRASOUND BREAST LIMITED: CPT

## 2025-02-14 PROCEDURE — G0279 TOMOSYNTHESIS, MAMMO: HCPCS

## 2025-04-02 ENCOUNTER — APPOINTMENT (OUTPATIENT)
Dept: GENERAL RADIOLOGY | Age: 45
End: 2025-04-02
Payer: COMMERCIAL

## 2025-04-02 ENCOUNTER — HOSPITAL ENCOUNTER (EMERGENCY)
Age: 45
Discharge: HOME | End: 2025-04-02
Payer: COMMERCIAL

## 2025-04-02 VITALS
OXYGEN SATURATION: 100 % | SYSTOLIC BLOOD PRESSURE: 131 MMHG | RESPIRATION RATE: 18 BRPM | TEMPERATURE: 98.4 F | HEART RATE: 80 BPM | DIASTOLIC BLOOD PRESSURE: 69 MMHG
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
## 2025-04-02 DIAGNOSIS — M25.561 ACUTE PAIN OF RIGHT KNEE: Primary | ICD-10-CM

## 2025-04-02 PROCEDURE — 99211 OFF/OP EST MAY X REQ PHY/QHP: CPT

## 2025-04-02 PROCEDURE — 73560 X-RAY EXAM OF KNEE 1 OR 2: CPT

## 2025-04-02 PROCEDURE — 6370000000 HC RX 637 (ALT 250 FOR IP): Performed by: NURSE PRACTITIONER

## 2025-04-02 RX ORDER — IBUPROFEN 800 MG/1
800 TABLET, FILM COATED ORAL 2 TIMES DAILY PRN
Qty: 28 TABLET | Refills: 0 | Status: SHIPPED | OUTPATIENT
Start: 2025-04-02 | End: 2025-04-16

## 2025-04-02 RX ORDER — IBUPROFEN 400 MG/1
800 TABLET, FILM COATED ORAL ONCE
Status: COMPLETED | OUTPATIENT
Start: 2025-04-02 | End: 2025-04-02

## 2025-04-02 RX ADMIN — IBUPROFEN 800 MG: 400 TABLET ORAL at 13:37

## 2025-04-02 ASSESSMENT — PAIN SCALES - GENERAL: PAINLEVEL_OUTOF10: 8

## 2025-04-02 ASSESSMENT — PAIN - FUNCTIONAL ASSESSMENT: PAIN_FUNCTIONAL_ASSESSMENT: 0-10
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
## 2025-04-29 ENCOUNTER — TRANSCRIBE ORDERS (OUTPATIENT)
Dept: ADMINISTRATIVE | Age: 45
End: 2025-04-29

## 2025-04-29 DIAGNOSIS — S83.91XA SPRAIN OF RIGHT KNEE, UNSPECIFIED LIGAMENT, INITIAL ENCOUNTER: Primary | ICD-10-CM

## 2025-04-30 ENCOUNTER — HOSPITAL ENCOUNTER (OUTPATIENT)
Dept: MRI IMAGING | Age: 45
Discharge: HOME | End: 2025-05-02
Payer: COMMERCIAL

## 2025-04-30 DIAGNOSIS — S83.91XA SPRAIN OF RIGHT KNEE, UNSPECIFIED LIGAMENT, INITIAL ENCOUNTER: ICD-10-CM

## 2025-04-30 PROCEDURE — 73721 MRI JNT OF LWR EXTRE W/O DYE: CPT

## 2025-05-19 ENCOUNTER — OFFICE VISIT (OUTPATIENT)
Dept: ORTHOPEDIC SURGERY | Age: 45
End: 2025-05-19
Payer: COMMERCIAL

## 2025-05-19 VITALS — BODY MASS INDEX: 36.65 KG/M2 | WEIGHT: 220 LBS | TEMPERATURE: 98.6 F | HEIGHT: 65 IN

## 2025-05-19 DIAGNOSIS — S83.91XA SPRAIN OF RIGHT KNEE, UNSPECIFIED LIGAMENT, INITIAL ENCOUNTER: Primary | ICD-10-CM

## 2025-05-19 DIAGNOSIS — S83.241A ACUTE MEDIAL MENISCUS TEAR OF RIGHT KNEE, INITIAL ENCOUNTER: ICD-10-CM

## 2025-05-19 PROCEDURE — 99203 OFFICE O/P NEW LOW 30 MIN: CPT | Performed by: FAMILY MEDICINE

## 2025-05-19 ASSESSMENT — ENCOUNTER SYMPTOMS
CHEST TIGHTNESS: 0
DIARRHEA: 0
NAUSEA: 0
ABDOMINAL PAIN: 0
SHORTNESS OF BREATH: 0
CONSTIPATION: 0
VOMITING: 0
COUGH: 0
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
## 2025-05-22 ENCOUNTER — EVALUATION (OUTPATIENT)
Dept: PHYSICAL THERAPY | Age: 45
End: 2025-05-22

## 2025-05-22 DIAGNOSIS — S83.91XA SPRAIN OF RIGHT KNEE, UNSPECIFIED LIGAMENT, INITIAL ENCOUNTER: Primary | ICD-10-CM

## 2025-05-22 NOTE — PROGRESS NOTES
Physical Therapy Daily Treatment Note    Date: 2025  Patient Name: Janell Diaz  : 1980   MRN: [id number]  DOInjury: 2025  DOSx: --  Referring Provider: Sergio Santoro PA-C  [address] [place] [address]     Medical Diagnosis:      Diagnosis Orders   1. Sprain of right knee, unspecified ligament, initial encounter          Janell has pain in all 3 compartments of the knee. The majority of her pain does appear to be coming from the patellofemoral joint. Igor was positive, but difficult to determine -- it appears to be more due to patellofemoral torque. Treatment will consist of ROM, strengthening, coordination, and modalities such as heat, ice and ES.     Access Code: [email or web address]  URL: [email or web address]  Date: 2025  Prepared by: Kemal Dillon    Exercises  - Supine Heel Slide  - 2 x daily - 3 sets - 10 reps  - Supine Quad Set (Mirrored)  - 2 x daily - 3 sets - 10 reps - 5 sec hold    X = TO BE PERFORMED NEXT VISIT  > = PROGRESS TO THIS FIRST  >> = PROGRESS TO THIS SECOND  >>> = PROGRESS TO THIS THIRD    S: See tejal  O:    Time  4042-9419       Visit      Claim # [id number]  Dx: S83.91XA  DOI: 2025   approved 12 visits through 2025  Repeat outcome measure at mid point and end.    Pain    Pain 3-5/10     ROM  Right:   AROM: 110° Flexion,  -5° Extension  Left:   AROM: 125° Flexion,  0° Extension     Modalities          MO   Manual      Stretching knee flexion   MT   Stretching       Heel props Monitor.  Add if needed.   TE   Knee flex stretch-seated Monitor.  Add if needed.   TE   Prone self flexion stretch   TE   Exercise       Nustep  X  TE   Quad sets 5 sec hold 3 x 10 reps  TE   Heel slides 3 x 10   TE   SLR Attempted.  Painful.  Did not add.  Will re-try.  TE   LAQ X  TE   Marching   TA   Squat    TA   Step-ups - FWD    TA   Step-ups - LAT   TA   Step-ups - BWD    TA   Step up and over reciprocally    TA   [] TG  [] Leg Press 2-leg   TE   [] TG  [] Leg Press 1-leg
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
## 2025-05-29 ENCOUNTER — TREATMENT (OUTPATIENT)
Dept: PHYSICAL THERAPY | Age: 45
End: 2025-05-29

## 2025-05-29 DIAGNOSIS — S83.91XA SPRAIN OF RIGHT KNEE, UNSPECIFIED LIGAMENT, INITIAL ENCOUNTER: Primary | ICD-10-CM

## 2025-05-29 NOTE — PROGRESS NOTES
Physical Therapy Daily Treatment Note    Date: 2025  Patient Name: Janell Diaz  : 1980   MRN: [id number]  DOInjury: 2025  DOSx: --  Referring Provider: Sergio Santoro PA-C  [address] [place], [place] [id number]     Medical Diagnosis:      Diagnosis Orders   1. Sprain of right knee, unspecified ligament, initial encounter          Janell has pain in all 3 compartments of the knee. The majority of her pain does appear to be coming from the patellofemoral joint. Igor was positive, but difficult to determine -- it appears to be more due to patellofemoral torque. Treatment will consist of ROM, strengthening, coordination, and modalities such as heat, ice and ES.     Access Code: [email or web address]  URL: [email or web address]  Date: 2025  Prepared by: Kemal Dillon    Exercises  - Supine Heel Slide  - 2 x daily - 3 sets - 10 reps  - Supine Quad Set (Mirrored)  - 2 x daily - 3 sets - 10 reps - 5 sec hold  Access Code: [email or web address]  URL: [email or web address]  Date: 2025  Prepared by: Joseph Trotter    Exercises  - Active Straight Leg Raise with Quad Set  - 1 x daily - 7 x weekly - 3 sets - 10 reps  - Seated Long Arc Quad  - 1 x daily - 7 x weekly - 3 sets - 10 reps  X = TO BE PERFORMED NEXT VISIT  > = PROGRESS TO THIS FIRST  >> = PROGRESS TO THIS SECOND  >>> = PROGRESS TO THIS THIRD    Precautions / Contraindications:  hearing loss -- does hear, also reads lips     S: pt reports no new changes since last visit . Relief after treatment session and use of modalities.   O:    Time  4837-4685 am      Visit      Claim # [id number]  Dx: S83.91XA  DOI: 2025   approved 12 visits through 2025  Repeat outcome measure at mid point and end.    Pain    Pain 3-5/10     ROM  Right:   AROM: 110° Flexion,  -5° Extension  Left:   AROM: 125° Flexion,  0° Extension     Modalities        ,Ice, + ES to right knee X 15 min  Post ex's  MO   Manual      Stretching knee flexion   MT   Stretching       Heel

## 2025-06-02 ENCOUNTER — OFFICE VISIT (OUTPATIENT)
Dept: ORTHOPEDIC SURGERY | Age: 45
End: 2025-06-02
Payer: COMMERCIAL

## 2025-06-02 DIAGNOSIS — S83.91XA SPRAIN OF RIGHT KNEE, UNSPECIFIED LIGAMENT, INITIAL ENCOUNTER: Primary | ICD-10-CM

## 2025-06-02 DIAGNOSIS — S83.241A ACUTE MEDIAL MENISCUS TEAR OF RIGHT KNEE, INITIAL ENCOUNTER: ICD-10-CM

## 2025-06-02 PROCEDURE — 20610 DRAIN/INJ JOINT/BURSA W/O US: CPT | Performed by: FAMILY MEDICINE

## 2025-06-02 RX ORDER — LIDOCAINE HYDROCHLORIDE 10 MG/ML
3 INJECTION, SOLUTION INFILTRATION; PERINEURAL ONCE
Status: COMPLETED | OUTPATIENT
Start: 2025-06-02 | End: 2025-06-02

## 2025-06-02 RX ORDER — TRIAMCINOLONE ACETONIDE 40 MG/ML
80 INJECTION, SUSPENSION INTRA-ARTICULAR; INTRAMUSCULAR ONCE
Status: COMPLETED | OUTPATIENT
Start: 2025-06-02 | End: 2025-06-02

## 2025-06-02 RX ADMIN — LIDOCAINE HYDROCHLORIDE 3 ML: 10 INJECTION, SOLUTION INFILTRATION; PERINEURAL at 12:51

## 2025-06-02 RX ADMIN — TRIAMCINOLONE ACETONIDE 80 MG: 40 INJECTION, SUSPENSION INTRA-ARTICULAR; INTRAMUSCULAR at 12:52

## 2025-06-02 ASSESSMENT — ENCOUNTER SYMPTOMS
COUGH: 0
VOMITING: 0
CHEST TIGHTNESS: 0
DIARRHEA: 0
CONSTIPATION: 0
NAUSEA: 0
SHORTNESS OF BREATH: 0
ABDOMINAL PAIN: 0
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
## 2025-06-03 ENCOUNTER — TREATMENT (OUTPATIENT)
Dept: PHYSICAL THERAPY | Age: 45
End: 2025-06-03
Payer: COMMERCIAL

## 2025-06-03 DIAGNOSIS — S83.91XA SPRAIN OF RIGHT KNEE, UNSPECIFIED LIGAMENT, INITIAL ENCOUNTER: Primary | ICD-10-CM

## 2025-06-03 PROCEDURE — 97110 THERAPEUTIC EXERCISES: CPT

## 2025-06-03 NOTE — PROGRESS NOTES
Physical Therapy Daily Treatment Note    Date: 6/3/2025  Patient Name: Janell Diaz  : 1980   MRN: [id number]  DOInjury: 2025  DOSx: --  Referring Provider: Sergio Santoro PA-C  [address] [place], [place] [id number]     Medical Diagnosis:      Diagnosis Orders   1. Sprain of right knee, unspecified ligament, initial encounter          Janell has pain in all 3 compartments of the knee. The majority of her pain does appear to be coming from the patellofemoral joint. Igor was positive, but difficult to determine -- it appears to be more due to patellofemoral torque. Treatment will consist of ROM, strengthening, coordination, and modalities such as heat, ice and ES.     Access Code: [email or web address]  URL: [email or web address]  Date: 2025  Prepared by: Kemal Dillon    Exercises  - Supine Heel Slide  - 2 x daily - 3 sets - 10 reps  - Supine Quad Set (Mirrored)  - 2 x daily - 3 sets - 10 reps - 5 sec hold  Access Code: [email or web address]  URL: [email or web address]  Date: 2025  Prepared by: Joseph Trotter    Exercises  - Active Straight Leg Raise with Quad Set  - 1 x daily - 7 x weekly - 3 sets - 10 reps  - Seated Long Arc Quad  - 1 x daily - 7 x weekly - 3 sets - 10 reps  X = TO BE PERFORMED NEXT VISIT  > = PROGRESS TO THIS FIRST  >> = PROGRESS TO THIS SECOND  >>> = PROGRESS TO THIS THIRD    Precautions / Contraindications:  hearing loss -- does hear, also reads lips     S: pt reports having gotten an injection to her knee since her last visit .   Feels much better now O:    Time  5154-1607 am      Visit  3 /12    Claim # [id number]  Dx: S83.91XA  DOI: 2025   approved 12 visits through 2025  Repeat outcome measure at mid point and end.    Pain    Pain 0-3/10     ROM  Right:   AROM: 110° Flexion,  -5° Extension  Left:   AROM: 125° Flexion,  0° Extension     Modalities        ,Ice, + ES to right knee Post ex's  MO   Manual      Stretching knee flexion   MT   Stretching       Heel props Monitor.

## 2025-06-05 ENCOUNTER — TREATMENT (OUTPATIENT)
Dept: PHYSICAL THERAPY | Age: 45
End: 2025-06-05

## 2025-06-05 DIAGNOSIS — S83.91XA SPRAIN OF RIGHT KNEE, UNSPECIFIED LIGAMENT, INITIAL ENCOUNTER: Primary | ICD-10-CM

## 2025-06-05 NOTE — PROGRESS NOTES
Physical Therapy Daily Treatment Note    Date: 2025  Patient Name: Janell Diaz  : 1980   MRN: [id number]  DOInjury: 2025  DOSx: --  Referring Provider: Sergio Santoro PA-C  [address] [place], [place] [id number]     Medical Diagnosis:      Diagnosis Orders   1. Sprain of right knee, unspecified ligament, initial encounter          Janell has pain in all 3 compartments of the knee. The majority of her pain does appear to be coming from the patellofemoral joint. Igor was positive, but difficult to determine -- it appears to be more due to patellofemoral torque. Treatment will consist of ROM, strengthening, coordination, and modalities such as heat, ice and ES.     Access Code: [email or web address]  URL: [email or web address]  Date: 2025  Prepared by: Kemal Dillon    Exercises  - Supine Heel Slide  - 2 x daily - 3 sets - 10 reps  - Supine Quad Set (Mirrored)  - 2 x daily - 3 sets - 10 reps - 5 sec hold  Access Code: [email or web address]  URL: [email or web address]  Date: 2025  Prepared by: Joseph Trotter    Exercises  - Active Straight Leg Raise with Quad Set  - 1 x daily - 7 x weekly - 3 sets - 10 reps  - Seated Long Arc Quad  - 1 x daily - 7 x weekly - 3 sets - 10 reps  X = TO BE PERFORMED NEXT VISIT  > = PROGRESS TO THIS FIRST  >> = PROGRESS TO THIS SECOND  >>> = PROGRESS TO THIS THIRD    Precautions / Contraindications:  hearing loss -- does hear, also reads lips     S: pt reports having gotten an injection to her knee since her last visit .   Feels much better now O:    Time  9455-8434 am      Visit      Claim # [id number]  Dx: S83.91XA  DOI: 2025  Wc approved 12 visits through 2025  Repeat outcome measure at mid point and end.    Pain    Pain 0-3/10     ROM  Right:   AROM: 110° Flexion,  -5° Extension  Left:   AROM: 125° Flexion,  0° Extension     Modalities        ,Ice, + ES to right knee Post ex's  MO   Manual      Stretching knee flexion   MT   Stretching       Heel props Monitor.

## 2025-06-11 ENCOUNTER — TREATMENT (OUTPATIENT)
Dept: PHYSICAL THERAPY | Age: 45
End: 2025-06-11

## 2025-06-11 DIAGNOSIS — S83.91XA SPRAIN OF RIGHT KNEE, UNSPECIFIED LIGAMENT, INITIAL ENCOUNTER: Primary | ICD-10-CM

## 2025-06-11 NOTE — PROGRESS NOTES
Physical Therapy Daily Treatment Note    Date: 2025  Patient Name: Janell Diaz  : 1980   MRN: [id number]  DOInjury: 2025  DOSx: --  Referring Provider: Sergio Santoro PA-C  [address] [place], [place] [id number]     Medical Diagnosis:      Diagnosis Orders   1. Sprain of right knee, unspecified ligament, initial encounter          Janell has pain in all 3 compartments of the knee. The majority of her pain does appear to be coming from the patellofemoral joint. Igor was positive, but difficult to determine -- it appears to be more due to patellofemoral torque. Treatment will consist of ROM, strengthening, coordination, and modalities such as heat, ice and ES.     Access Code: [email or web address]  URL: [email or web address]  Date: 2025  Prepared by: Kemal Dillon    Exercises  - Supine Heel Slide  - 2 x daily - 3 sets - 10 reps  - Supine Quad Set (Mirrored)  - 2 x daily - 3 sets - 10 reps - 5 sec hold  Access Code: [email or web address]  URL: [email or web address]  Date: 2025  Prepared by: Joseph Trotter    Exercises  - Active Straight Leg Raise with Quad Set  - 1 x daily - 7 x weekly - 3 sets - 10 reps  - Seated Long Arc Quad  - 1 x daily - 7 x weekly - 3 sets - 10 reps  X = TO BE PERFORMED NEXT VISIT  > = PROGRESS TO THIS FIRST  >> = PROGRESS TO THIS SECOND  >>> = PROGRESS TO THIS THIRD    Precautions / Contraindications:  hearing loss -- does hear, also reads lips     S: pt reports being able to have taken a walk lately ~ 1 miles.  O:    Time  7567-8735 am      Visit      Claim # [id number]  Dx: S83.91XA  DOI: 2025   approved 12 visits through 2025  Repeat outcome measure at mid point and end.    Pain    Pain 0-3/10     ROM  Right:   AROM: 110° Flexion,  -5° Extension  Left:   AROM: 125° Flexion,  0° Extension     Modalities        ,Ice, + ES to right knee Post ex's  MO   Manual      Stretching knee flexion   MT   Stretching       Heel props Monitor.  Add if needed.   TE   Knee flex

## 2025-06-13 ENCOUNTER — TREATMENT (OUTPATIENT)
Dept: PHYSICAL THERAPY | Age: 45
End: 2025-06-13

## 2025-06-13 DIAGNOSIS — S83.91XA SPRAIN OF RIGHT KNEE, UNSPECIFIED LIGAMENT, INITIAL ENCOUNTER: Primary | ICD-10-CM

## 2025-06-13 NOTE — PROGRESS NOTES
Physical Therapy Daily Treatment Note    Date: 2025  Patient Name: Janell Diaz  : 1980   MRN: [id number]  DOInjury: 2025  DOSx: --  Referring Provider: Sergio Santoro PA-C  [address] [place], [place] [id number]     Medical Diagnosis:      Diagnosis Orders   1. Sprain of right knee, unspecified ligament, initial encounter          Janell has pain in all 3 compartments of the knee. The majority of her pain does appear to be coming from the patellofemoral joint. Igor was positive, but difficult to determine -- it appears to be more due to patellofemoral torque. Treatment will consist of ROM, strengthening, coordination, and modalities such as heat, ice and ES.     Access Code: [email or web address]  URL: [email or web address]  Date: 2025  Prepared by: Kemal Dillon    Exercises  - Supine Heel Slide  - 2 x daily - 3 sets - 10 reps  - Supine Quad Set (Mirrored)  - 2 x daily - 3 sets - 10 reps - 5 sec hold  Access Code: [email or web address]  URL: [email or web address]  Date: 2025  Prepared by: Joseph Trotter    Exercises  - Active Straight Leg Raise with Quad Set  - 1 x daily - 7 x weekly - 3 sets - 10 reps  - Seated Long Arc Quad  - 1 x daily - 7 x weekly - 3 sets - 10 reps  X = TO BE PERFORMED NEXT VISIT  > = PROGRESS TO THIS FIRST  >> = PROGRESS TO THIS SECOND  >>> = PROGRESS TO THIS THIRD    Precautions / Contraindications:  hearing loss -- does hear, also reads lips     S: pt reports feeling pretty good today .  O:    Time  0356-8957 am      Visit      Claim # [id number]  Dx: S83.91XA  DOI: 2025   approved 12 visits through 2025  Repeat outcome measure at mid point and end.    Pain    Pain 0-3/10     ROM  Right:   AROM: 110° Flexion,  -5° Extension  Left:   AROM: 125° Flexion,  0° Extension     Modalities        ,Ice, + ES to right knee Post ex's  MO   Manual      Stretching knee flexion   MT   Stretching       Heel props Monitor.  Add if needed.   TE   Knee flex stretch-seated Monitor.

## 2025-06-16 ENCOUNTER — TREATMENT (OUTPATIENT)
Dept: PHYSICAL THERAPY | Age: 45
End: 2025-06-16
Payer: COMMERCIAL

## 2025-06-16 DIAGNOSIS — S83.91XA SPRAIN OF RIGHT KNEE, UNSPECIFIED LIGAMENT, INITIAL ENCOUNTER: Primary | ICD-10-CM

## 2025-06-16 PROCEDURE — 97530 THERAPEUTIC ACTIVITIES: CPT

## 2025-06-16 PROCEDURE — 97110 THERAPEUTIC EXERCISES: CPT

## 2025-06-16 NOTE — PROGRESS NOTES
Physical Therapy Daily Treatment Note    Date: 2025  Patient Name: Janell Diaz  : 1980   MRN: [id number]  DOInjury: 2025  DOSx: --  Referring Provider: Sergio Santoro PA-C  [address] [place], [place] [id number]     Medical Diagnosis:      Diagnosis Orders   1. Sprain of right knee, unspecified ligament, initial encounter          Janell has pain in all 3 compartments of the knee. The majority of her pain does appear to be coming from the patellofemoral joint. Igor was positive, but difficult to determine -- it appears to be more due to patellofemoral torque. Treatment will consist of ROM, strengthening, coordination, and modalities such as heat, ice and ES.     Access Code: [email or web address]  URL: [email or web address]  Date: 2025  Prepared by: Kemal Dillon    Exercises  - Supine Heel Slide  - 2 x daily - 3 sets - 10 reps  - Supine Quad Set (Mirrored)  - 2 x daily - 3 sets - 10 reps - 5 sec hold  Access Code: [email or web address]  URL: [email or web address]  Date: 2025  Prepared by: Joseph Trotter    Exercises  - Active Straight Leg Raise with Quad Set  - 1 x daily - 7 x weekly - 3 sets - 10 reps  - Seated Long Arc Quad  - 1 x daily - 7 x weekly - 3 sets - 10 reps  X = TO BE PERFORMED NEXT VISIT  > = PROGRESS TO THIS FIRST  >> = PROGRESS TO THIS SECOND  >>> = PROGRESS TO THIS THIRD    Precautions / Contraindications:  hearing loss -- does hear, also reads lips     S: pt reports taking a short walk over the weekend and now her knee is sore on the inside muscle area  .  O:    Time  6967-6683 am      Visit      Claim # [id number]  Dx: S83.91XA  DOI: 2025   approved 12 visits through 2025  Repeat outcome measure at mid point and end.    Pain    Pain 0-3/10     ROM  Right:   AROM: 110° Flexion,  -5° Extension  Left:   AROM: 125° Flexion,  0° Extension     Modalities        ,Ice, + ES to right knee Post ex's  MO   Manual      Stretching knee flexion   MT   Stretching       Heel props

## 2025-06-18 ENCOUNTER — TELEPHONE (OUTPATIENT)
Dept: PHYSICAL THERAPY | Age: 45
End: 2025-06-18

## 2025-06-18 DIAGNOSIS — S83.91XA SPRAIN OF RIGHT KNEE, UNSPECIFIED LIGAMENT, INITIAL ENCOUNTER: Primary | ICD-10-CM

## 2025-06-24 ENCOUNTER — TREATMENT (OUTPATIENT)
Dept: PHYSICAL THERAPY | Age: 45
End: 2025-06-24
Payer: COMMERCIAL

## 2025-06-24 DIAGNOSIS — S83.91XA SPRAIN OF RIGHT KNEE, UNSPECIFIED LIGAMENT, INITIAL ENCOUNTER: Primary | ICD-10-CM

## 2025-06-24 PROCEDURE — 97530 THERAPEUTIC ACTIVITIES: CPT

## 2025-06-24 PROCEDURE — 97110 THERAPEUTIC EXERCISES: CPT

## 2025-06-24 NOTE — PROGRESS NOTES
Physical Therapy Daily Treatment Note    Date: 2025  Patient Name: Janell Diaz  : 1980   MRN: [id number]  DOInjury: 2025  DOSx: --  Referring Provider: Sergio Santoro PA-C  [address] [place], [place] [id number]     Medical Diagnosis:      Diagnosis Orders   1. Sprain of right knee, unspecified ligament, initial encounter          Janell has pain in all 3 compartments of the knee. The majority of her pain does appear to be coming from the patellofemoral joint. Igor was positive, but difficult to determine -- it appears to be more due to patellofemoral torque. Treatment will consist of ROM, strengthening, coordination, and modalities such as heat, ice and ES.     Access Code: [email or web address]  URL: [email or web address]  Date: 2025  Prepared by: Kemal Dillon    Exercises  - Supine Heel Slide  - 2 x daily - 3 sets - 10 reps  - Supine Quad Set (Mirrored)  - 2 x daily - 3 sets - 10 reps - 5 sec hold  Access Code: [email or web address]  URL: [email or web address]  Date: 2025  Prepared by: Joseph Trotter    Exercises  - Active Straight Leg Raise with Quad Set  - 1 x daily - 7 x weekly - 3 sets - 10 reps  - Seated Long Arc Quad  - 1 x daily - 7 x weekly - 3 sets - 10 reps  X = TO BE PERFORMED NEXT VISIT  > = PROGRESS TO THIS FIRST  >> = PROGRESS TO THIS SECOND  >>> = PROGRESS TO THIS THIRD    Precautions / Contraindications:  hearing loss -- does hear, also reads lips     S: pt reports feeling good .  O:    Time  7172-2863 am      Visit      Claim # [id number]  Dx: S83.91XA  DOI: 2025   approved 12 visits through 2025  Repeat outcome measure at mid point and end.    Pain    Pain 0-3/10     ROM  Right:   AROM: 110° Flexion,  -5° Extension  Left:   AROM: 125° Flexion,  0° Extension     Modalities        ,Ice, + ES to right knee Post ex's  MO   Manual      Stretching knee flexion   MT   Stretching       Heel props Monitor.  Add if needed.   TE   Knee flex stretch-seated Monitor.  Add if

## 2025-06-27 ENCOUNTER — TREATMENT (OUTPATIENT)
Dept: PHYSICAL THERAPY | Age: 45
End: 2025-06-27

## 2025-06-27 DIAGNOSIS — S83.91XA SPRAIN OF RIGHT KNEE, UNSPECIFIED LIGAMENT, INITIAL ENCOUNTER: Primary | ICD-10-CM

## 2025-06-27 NOTE — PROGRESS NOTES
Physical Therapy Daily Treatment Note    Date: 2025  Patient Name: Janell Diaz  : 1980   MRN: [id number]  DOInjury: 2025  DOSx: --  Referring Provider: Sergio Santoro PA-C  [address] [place], [place] [id number]     Medical Diagnosis:      Diagnosis Orders   1. Sprain of right knee, unspecified ligament, initial encounter          Janell has pain in all 3 compartments of the knee. The majority of her pain does appear to be coming from the patellofemoral joint. Igor was positive, but difficult to determine -- it appears to be more due to patellofemoral torque. Treatment will consist of ROM, strengthening, coordination, and modalities such as heat, ice and ES.     Access Code: [email or web address]  URL: [email or web address]  Date: 2025  Prepared by: Kemal Dillon    Exercises  - Supine Heel Slide  - 2 x daily - 3 sets - 10 reps  - Supine Quad Set (Mirrored)  - 2 x daily - 3 sets - 10 reps - 5 sec hold  Access Code: [email or web address]  URL: [email or web address]  Date: 2025  Prepared by: Joseph Trotter    Exercises  - Active Straight Leg Raise with Quad Set  - 1 x daily - 7 x weekly - 3 sets - 10 reps  - Seated Long Arc Quad  - 1 x daily - 7 x weekly - 3 sets - 10 reps  X = TO BE PERFORMED NEXT VISIT  > = PROGRESS TO THIS FIRST  >> = PROGRESS TO THIS SECOND  >>> = PROGRESS TO THIS THIRD    Precautions / Contraindications:  hearing loss -- does hear, also reads lips     S: pt reports feeling good .  O:    Time  0877-7473 am      Visit      Claim # [id number]  Dx: S83.91XA  DOI: 2025   approved 12 visits through 2025  Repeat outcome measure at mid point and end.    Pain    Pain 0-3/10     ROM  Right:   AROM: 110° Flexion,  -5° Extension  Left:   AROM: 125° Flexion,  0° Extension     Modalities        ,Ice, + ES to right knee Post ex's  MO   Manual      Stretching knee flexion   MT   Stretching       Heel props Monitor.  Add if needed.   TE   Knee flex stretch-seated Monitor.  Add if

## 2025-07-01 ENCOUNTER — TREATMENT (OUTPATIENT)
Dept: PHYSICAL THERAPY | Age: 45
End: 2025-07-01
Payer: COMMERCIAL

## 2025-07-01 DIAGNOSIS — S83.91XA SPRAIN OF RIGHT KNEE, UNSPECIFIED LIGAMENT, INITIAL ENCOUNTER: Primary | ICD-10-CM

## 2025-07-01 PROCEDURE — 97530 THERAPEUTIC ACTIVITIES: CPT

## 2025-07-01 PROCEDURE — 97110 THERAPEUTIC EXERCISES: CPT

## 2025-07-01 NOTE — PROGRESS NOTES
Physical Therapy Daily Treatment Note    Date: 2025  Patient Name: Janell Diaz  : 1980   MRN: [id number]  DOInjury: 2025  DOSx: --  Referring Provider: Sergio Santoro PA-C  [address] [place], [place] [phone]     Medical Diagnosis:      Diagnosis Orders   1. Sprain of right knee, unspecified ligament, initial encounter          Janell has pain in all 3 compartments of the knee. The majority of her pain does appear to be coming from the patellofemoral joint. Igor was positive, but difficult to determine -- it appears to be more due to patellofemoral torque. Treatment will consist of ROM, strengthening, coordination, and modalities such as heat, ice and ES.     Access Code: [email or web address]  URL: [email or web address]  Date: 2025  Prepared by: Kemal Dillon    Exercises  - Supine Heel Slide  - 2 x daily - 3 sets - 10 reps  - Supine Quad Set (Mirrored)  - 2 x daily - 3 sets - 10 reps - 5 sec hold  Access Code: [email or web address]  URL: [email or web address]  Date: 2025  Prepared by: Joseph Trotter    Exercises  - Active Straight Leg Raise with Quad Set  - 1 x daily - 7 x weekly - 3 sets - 10 reps  - Seated Long Arc Quad  - 1 x daily - 7 x weekly - 3 sets - 10 reps  X = TO BE PERFORMED NEXT VISIT  > = PROGRESS TO THIS FIRST  >> = PROGRESS TO THIS SECOND  >>> = PROGRESS TO THIS THIRD    Precautions / Contraindications:  hearing loss -- does hear, also reads lips     S: pt reports feeling good .  O:    Time  1212-5739 am      Visit  10 /12    Claim # [id number]  Dx: S83.91XA  DOI: 2025   approved 12 visits through 2025  Repeat outcome measure at mid point and end.    Pain    Pain 0-3/10     ROM  Right:   AROM: 110° Flexion,  -5° Extension  Left:   AROM: 125° Flexion,  0° Extension     Modalities        ,Ice, + ES to right knee Post ex's  MO   Manual      Stretching knee flexion   MT   Stretching       Heel props Monitor.  Add if needed.   TE   Knee flex stretch-seated Monitor.  Add if

## 2025-07-09 ENCOUNTER — TELEPHONE (OUTPATIENT)
Dept: PHYSICAL THERAPY | Age: 45
End: 2025-07-09

## 2025-07-14 ENCOUNTER — OFFICE VISIT (OUTPATIENT)
Dept: ORTHOPEDIC SURGERY | Age: 45
End: 2025-07-14
Payer: COMMERCIAL

## 2025-07-14 VITALS — HEIGHT: 65 IN | BODY MASS INDEX: 36.65 KG/M2 | TEMPERATURE: 98.6 F | WEIGHT: 220 LBS

## 2025-07-14 DIAGNOSIS — S83.91XA SPRAIN OF RIGHT KNEE, UNSPECIFIED LIGAMENT, INITIAL ENCOUNTER: ICD-10-CM

## 2025-07-14 DIAGNOSIS — S83.241A ACUTE MEDIAL MENISCUS TEAR OF RIGHT KNEE, INITIAL ENCOUNTER: Primary | ICD-10-CM

## 2025-07-14 PROCEDURE — 99213 OFFICE O/P EST LOW 20 MIN: CPT | Performed by: FAMILY MEDICINE

## 2025-07-14 RX ORDER — NAPROXEN 500 MG/1
500 TABLET ORAL 2 TIMES DAILY WITH MEALS
Qty: 60 TABLET | Refills: 2 | Status: SHIPPED | OUTPATIENT
Start: 2025-07-14 | End: 2025-10-12

## 2025-07-14 ASSESSMENT — ENCOUNTER SYMPTOMS
VOMITING: 0
NAUSEA: 0
CHEST TIGHTNESS: 0
SHORTNESS OF BREATH: 0
DIARRHEA: 0
ABDOMINAL PAIN: 0
COUGH: 0
CONSTIPATION: 0

## 2025-07-14 NOTE — PROGRESS NOTES
sclera  Chest wall/Lung: Respirations regular and unlabored. No cyanosis.  Heart: RRR, distal pulses intact.  Neurologic: Alert&Oriented x3. Sensation grossly intact. No focal motor deficits detected.  Musculoskeletal: right Knee:  Full ROM with flexion and extensoin. Pain free.  1+ effusion.  No obvious bony abnormality or ecchymosis.  TTP: ( + ) MJL, ( + ) LJL, ( - ) patellar tendon, ( - ) quadriceps tendon, ( - ) patellar facets  Special Tests: ( - ) Patellar apprehension, ( - ) patellar grind  Stable and without pain to varus and valgus stress at 0 and 30 degrees of knee flexion.  ACL: ( - ) Lachman's, ( - ) anterior drawer  PCL: ( - ) posterior drawer, ( - ) sag sign  Meniscus: ( equivocal ) Igor's, (equivocal) Thessaly's    Imaging:  Multiple views of the right knee demonstrate no significant degenerative changes, no evidence of fracture, no evidence dislocation. X-rays were personally reviewed and interpreted by myself. Radiology reports were also reviewed.    MRI KNEE RIGHT WO CONTRAST  IMPRESSION:  1. Slight irregularity of the posterior root attachment of the medial  meniscus, though not definitively torn.  2. Small areas of grade 3 chondral signal change in the medial femoral  condyle.  3. Moderate joint effusion.  4. Small popliteal/Baker's cyst, communicating to a 4.2 cm ganglion  inferiorly.    ______________________________________________________________________    Assessment & Plan :  1. Acute medial meniscus tear of right knee, initial encounter  - naproxen (NAPROSYN) 500 MG tablet; Take 1 tablet by mouth 2 times daily (with meals)  Dispense: 60 tablet; Refill: 2  2. Sprain of right knee, unspecified ligament, initial encounter  - naproxen (NAPROSYN) 500 MG tablet; Take 1 tablet by mouth 2 times daily (with meals)  Dispense: 60 tablet; Refill: 2      Patient presents to the office today for evaluation of right knee pain consistent with sprain of the right knee and an acute medial meniscus tear

## 2025-07-29 ASSESSMENT — ENCOUNTER SYMPTOMS
ABDOMINAL PAIN: 0
VOMITING: 0
COUGH: 0
NAUSEA: 0
CHEST TIGHTNESS: 0
SHORTNESS OF BREATH: 0
DIARRHEA: 0
CONSTIPATION: 0
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
## 2025-07-30 ENCOUNTER — OFFICE VISIT (OUTPATIENT)
Dept: ORTHOPEDIC SURGERY | Age: 45
End: 2025-07-30
Payer: COMMERCIAL

## 2025-07-30 VITALS — HEIGHT: 65 IN | BODY MASS INDEX: 36.65 KG/M2 | WEIGHT: 220 LBS | TEMPERATURE: 98.6 F

## 2025-07-30 DIAGNOSIS — S83.91XA SPRAIN OF RIGHT KNEE, UNSPECIFIED LIGAMENT, INITIAL ENCOUNTER: ICD-10-CM

## 2025-07-30 DIAGNOSIS — S83.241A ACUTE MEDIAL MENISCUS TEAR OF RIGHT KNEE, INITIAL ENCOUNTER: Primary | ICD-10-CM

## 2025-07-30 PROCEDURE — 99213 OFFICE O/P EST LOW 20 MIN: CPT | Performed by: FAMILY MEDICINE

## 2025-08-19 ENCOUNTER — TELEPHONE (OUTPATIENT)
Dept: ORTHOPEDIC SURGERY | Age: 45
End: 2025-08-19

## 2025-08-29 ENCOUNTER — OFFICE VISIT (OUTPATIENT)
Dept: ORTHOPEDIC SURGERY | Age: 45
End: 2025-08-29

## 2025-08-29 DIAGNOSIS — S83.241A ACUTE MEDIAL MENISCUS TEAR OF RIGHT KNEE, INITIAL ENCOUNTER: Primary | ICD-10-CM

## 2025-08-29 ASSESSMENT — ENCOUNTER SYMPTOMS
DIARRHEA: 0
SHORTNESS OF BREATH: 0
ABDOMINAL PAIN: 0
CHEST TIGHTNESS: 0
CONSTIPATION: 0
VOMITING: 0
NAUSEA: 0
COUGH: 0

## 2025-09-03 ENCOUNTER — OFFICE VISIT (OUTPATIENT)
Dept: ORTHOPEDIC SURGERY | Age: 45
End: 2025-09-03

## 2025-09-03 DIAGNOSIS — S83.241A ACUTE MEDIAL MENISCUS TEAR OF RIGHT KNEE, INITIAL ENCOUNTER: Primary | ICD-10-CM

## 2025-09-03 ASSESSMENT — ENCOUNTER SYMPTOMS
VOMITING: 0
CONSTIPATION: 0
NAUSEA: 0
SHORTNESS OF BREATH: 0
COUGH: 0
DIARRHEA: 0
CHEST TIGHTNESS: 0
ABDOMINAL PAIN: 0